# Patient Record
Sex: MALE | Race: WHITE
[De-identification: names, ages, dates, MRNs, and addresses within clinical notes are randomized per-mention and may not be internally consistent; named-entity substitution may affect disease eponyms.]

---

## 2019-02-12 ENCOUNTER — HOSPITAL ENCOUNTER (EMERGENCY)
Dept: HOSPITAL 12 - ER | Age: 54
LOS: 1 days | Discharge: HOME | End: 2019-02-13
Payer: COMMERCIAL

## 2019-02-12 VITALS — BODY MASS INDEX: 26.11 KG/M2 | WEIGHT: 197 LBS | HEIGHT: 73 IN

## 2019-02-12 DIAGNOSIS — Z79.2: ICD-10-CM

## 2019-02-12 DIAGNOSIS — L02.11: Primary | ICD-10-CM

## 2019-02-12 PROCEDURE — A4663 DIALYSIS BLOOD PRESSURE CUFF: HCPCS

## 2019-02-13 VITALS — DIASTOLIC BLOOD PRESSURE: 82 MMHG | SYSTOLIC BLOOD PRESSURE: 120 MMHG

## 2019-02-13 NOTE — NUR
PT BIB SELF. C/O SWELLING ON L.LATERAL NECK. PT HAD SURGERY DONE FOR SPINAL 
PLATES. ABOUT 3 NIGHTS AGO PT NOTICED SLIGHT SWELLING IN HIS NECK. HE PHONED 
HIS MD 3 DAYS AGO AND SHE WROTE RX FOR DOXYCYCLINE. PT'S SWELLING GOT WORST SO 
HE PHONED HIS MD EARLIER TONIGHT AND SHE ADVISED HIM TO GO TO THE ER. PT A/OX4 
AND IS ABLE TO VERBALIZE COMPLAINTS.